# Patient Record
Sex: FEMALE | Race: WHITE | Employment: FULL TIME | URBAN - METROPOLITAN AREA
[De-identification: names, ages, dates, MRNs, and addresses within clinical notes are randomized per-mention and may not be internally consistent; named-entity substitution may affect disease eponyms.]

---

## 2017-01-10 ENCOUNTER — HOSPITAL ENCOUNTER (EMERGENCY)
Age: 37
Discharge: HOME | End: 2017-01-10
Attending: EMERGENCY MEDICINE

## 2017-01-10 ENCOUNTER — APPOINTMENT (OUTPATIENT)
Dept: GENERAL RADIOLOGY | Age: 37
End: 2017-01-10
Attending: EMERGENCY MEDICINE

## 2017-01-10 VITALS
HEIGHT: 62 IN | HEART RATE: 91 BPM | WEIGHT: 167 LBS | RESPIRATION RATE: 18 BRPM | SYSTOLIC BLOOD PRESSURE: 126 MMHG | DIASTOLIC BLOOD PRESSURE: 76 MMHG | OXYGEN SATURATION: 99 % | BODY MASS INDEX: 30.73 KG/M2 | TEMPERATURE: 97.3 F

## 2017-01-10 DIAGNOSIS — S86.891A SHIN SPLINTS, RIGHT, INITIAL ENCOUNTER: Primary | ICD-10-CM

## 2017-01-10 LAB — HCG UR QL: NEGATIVE

## 2017-01-10 NOTE — DISCHARGE INSTRUCTIONS
Shin Splints: Care Instructions  Your Care Instructions    Shin splints cause pain in the shin, the front part of the lower leg. They can also cause swelling. The pain is most likely from repeated stress on the shinbone (tibia) and the tissue that connects the muscle to the tibia. Shin splints are common in people who run or jog. Activities where you run or jump on hard surfaces, such as basketball or tennis, can also lead to shin splints. They can also be caused by training too hard or running in shoes that are worn out. Follow-up care is a key part of your treatment and safety. Be sure to make and go to all appointments, and call your doctor if you are having problems. It's also a good idea to know your test results and keep a list of the medicines you take. How can you care for yourself at home? · Stop doing the activity that is causing pain, or do less of it, until you feel better. ¨ Run or exercise only on soft surfaces, such as dirt or grass. ¨ Run on level ground, and avoid hills. ¨ Reduce your speed and distance when you run. · If you have pain, prop up the sore leg on a pillow and ice it. Try to keep your leg above the level of your heart. This will help reduce swelling. ¨ Put ice or a cold pack on the area for 10 to 20 minutes at a time. Try to do this every 1 to 2 hours (when you are awake) or until the swelling goes down. Put a thin cloth between the ice and your skin. · Take an over-the-counter pain medicine, such as acetaminophen (Tylenol), ibuprofen (Advil, Motrin), or naproxen (Aleve). Be safe with medicines. Read and follow all instructions on the label. · If your doctor gave you stretches or exercises to do, do them exactly as directed. · Get a new pair of shoes. Pick shoes with good arch support and a cushioned sole. Or try shoe inserts (orthotics). Use them in both shoes, even if only one leg hurts. · Don't go back to your old exercise routine too quickly after you feel better. Start slowly. Then, bit by bit, increase how often and how long you work out. If you start out too fast, your pain may come back. When should you call for help? Watch closely for changes in your health, and be sure to contact your doctor if:  · You have new or worse pain in your shin. · The pain becomes focused in one small area of the shin. · You are not getting better after 2 weeks. Where can you learn more? Go to http://lily-buck.info/. Enter O844 in the search box to learn more about \"Shin Splints: Care Instructions. \"  Current as of: May 23, 2016  Content Version: 11.1  © 5804-3375 [employer]. Care instructions adapted under license by [employer] (which disclaims liability or warranty for this information). If you have questions about a medical condition or this instruction, always ask your healthcare professional. [address] any warranty or liability for your use of this information.

## 2017-01-10 NOTE — UC PROVIDER NOTE
Patient is a 29 y.o. female presenting with leg pain. The history is provided by the patient. Leg Pain    This is a new problem. The current episode started more than 2 days ago (4 days). The problem occurs constantly. The problem has not changed since onset. The pain is present in the right lower leg. The quality of the pain is described as sharp. The pain is at a severity of 7/10. The pain is moderate. Pertinent negatives include no numbness, full range of motion, no stiffness, no tingling and no back pain. The symptoms are aggravated by standing and activity. She has tried rest for the symptoms. The treatment provided no relief. There has been a history of trauma (stepped down off a step awkwardly). Past Medical History   Diagnosis Date    ADD (attention deficit disorder)     Anemia NEC      after 1st c/s    AR (allergic rhinitis)     Asthma      seasonal, uses inhaler prn        Past Surgical History   Procedure Laterality Date    Pr  delivery only       2009    Hx other surgical       bilateral wrist surgery for ganglion cysts         Family History   Problem Relation Age of Onset    Cancer Maternal Grandmother      ovarian    Psychiatric Disorder Maternal Grandmother      Alzheimers    Cancer Maternal Grandfather      prostate    Psychiatric Disorder Maternal Grandfather      Parkinsons    Cancer Paternal Grandmother      Breast    Asthma Paternal Grandmother     Diabetes Paternal Grandmother     Alcohol abuse Neg Hx     Arthritis-rheumatoid Neg Hx     Bleeding Prob Neg Hx     Elevated Lipids Neg Hx     Headache Neg Hx     Heart Disease Neg Hx     Hypertension Neg Hx     Lung Disease Neg Hx     Migraines Neg Hx     Stroke Neg Hx     Mental Retardation Neg Hx         Social History     Social History    Marital status:      Spouse name: N/A    Number of children: N/A    Years of education: N/A     Occupational History    Not on file.      Social History Main Topics    Smoking status: Never Smoker    Smokeless tobacco: Never Used    Alcohol use No    Drug use: No    Sexual activity: Not on file     Other Topics Concern    Not on file     Social History Narrative                ALLERGIES: Amoxicillin and Pcn [penicillins]    Review of Systems   Musculoskeletal: Negative for back pain and stiffness. Neurological: Negative for tingling and numbness. All other systems reviewed and are negative. Vitals:    01/10/17 1601   BP: 126/76   Pulse: 91   Resp: 18   Temp: 97.3 °F (36.3 °C)   SpO2: 99%   Weight: 75.8 kg (167 lb)   Height: 5' 2\" (1.575 m)       Physical Exam   Constitutional: She is oriented to person, place, and time. She appears well-developed and well-nourished. No distress. HENT:   Head: Normocephalic and atraumatic. Musculoskeletal: Normal range of motion. She exhibits no edema, tenderness or deformity. Right lower leg, no erythema, no swelling, no point tenderness, full rom right knee and ankle, neurovascular intact distally   Neurological: She is alert and oriented to person, place, and time. No cranial nerve deficit. She exhibits normal muscle tone. Skin: Skin is warm and dry. No rash noted. She is not diaphoretic. No erythema. No pallor. Psychiatric: She has a normal mood and affect. Her behavior is normal. Judgment and thought content normal.   Nursing note and vitals reviewed.       MDM     Differential Diagnosis; Clinical Impression; Plan:     Right lower leg pain, normal ankle and knee exam, xrays negative for acute finding  Differential includes stress fracture, shin splints, with normal xrays and lack of repetitive exercise, stress fracture less likely      Procedures

## 2017-08-27 ENCOUNTER — HOSPITAL ENCOUNTER (EMERGENCY)
Age: 37
Discharge: HOME | End: 2017-08-27
Attending: FAMILY MEDICINE

## 2017-08-27 VITALS
RESPIRATION RATE: 16 BRPM | SYSTOLIC BLOOD PRESSURE: 160 MMHG | HEART RATE: 78 BPM | BODY MASS INDEX: 30.35 KG/M2 | DIASTOLIC BLOOD PRESSURE: 63 MMHG | WEIGHT: 164.9 LBS | OXYGEN SATURATION: 97 % | TEMPERATURE: 98.1 F | HEIGHT: 62 IN
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
## 2017-08-27 RX ORDER — PREDNISONE 5 MG/1
TABLET ORAL
Qty: 21 TAB | Refills: 0 | Status: SHIPPED | OUTPATIENT
Start: 2017-08-27 | End: 2019-03-16

## 2017-08-27 NOTE — UC PROVIDER NOTE
Patient is a 40 y.o. female presenting with Insect Bite. The history is provided by the patient. Insect Bite   This is a new problem. The current episode started more than 2 days ago. The problem occurs daily. The problem has not changed since onset. Pertinent negatives include no chest pain. Nothing aggravates the symptoms. Nothing relieves the symptoms. She has tried a warm compress for the symptoms. The treatment provided mild relief. Past Medical History:   Diagnosis Date    ADD (attention deficit disorder)     Anemia NEC     after 1st c/s    AR (allergic rhinitis)     Asthma     seasonal, uses inhaler prn        Past Surgical History:   Procedure Laterality Date     DELIVERY ONLY      2009    HX OTHER SURGICAL      bilateral wrist surgery for ganglion cysts         Family History   Problem Relation Age of Onset    Cancer Maternal Grandmother      ovarian    Psychiatric Disorder Maternal Grandmother      Alzheimers    Cancer Maternal Grandfather      prostate    Psychiatric Disorder Maternal Grandfather      Parkinsons    Cancer Paternal Grandmother      Breast    Asthma Paternal Grandmother     Diabetes Paternal Grandmother     Alcohol abuse Neg Hx     Arthritis-rheumatoid Neg Hx     Bleeding Prob Neg Hx     Elevated Lipids Neg Hx     Headache Neg Hx     Heart Disease Neg Hx     Hypertension Neg Hx     Lung Disease Neg Hx     Migraines Neg Hx     Stroke Neg Hx     Mental Retardation Neg Hx         Social History     Social History    Marital status:      Spouse name: N/A    Number of children: N/A    Years of education: N/A     Occupational History    Not on file.      Social History Main Topics    Smoking status: Never Smoker    Smokeless tobacco: Never Used    Alcohol use No    Drug use: No    Sexual activity: Not on file     Other Topics Concern    Not on file     Social History Narrative                ALLERGIES: Amoxicillin and Pcn [penicillins]    Review of Systems   Constitutional: Negative for activity change and fatigue. HENT: Negative for congestion. Respiratory: Negative for chest tightness. Cardiovascular: Negative for chest pain. Vitals:    08/27/17 1554   BP: 160/63   Pulse: 78   Resp: 16   Temp: 98.1 °F (36.7 °C)   SpO2: 97%   Weight: 74.8 kg (164 lb 14.4 oz)   Height: 5' 2\" (1.575 m)       Physical Exam   Constitutional: She is oriented to person, place, and time. She appears well-developed and well-nourished. HENT:   Head: Atraumatic. Eyes: EOM are normal.   Pulmonary/Chest: Effort normal and breath sounds normal.   Neurological: She is alert and oriented to person, place, and time. Skin: Skin is warm and dry. Rash noted. Psychiatric: She has a normal mood and affect. Her behavior is normal. Judgment and thought content normal.   Nursing note and vitals reviewed. MDM     Differential Diagnosis; Clinical Impression; Plan:     CLINICAL IMPRESSION:  Bug bites, initial encounter  (primary encounter diagnosis)    Plan:  1. Suspect bed bugs  2. Medrol dose pack  3. Risk of Significant Complications, Morbidity, and/or Mortality:   Presenting problems: Moderate  Diagnostic procedures: Moderate  Management options:   Moderate  Progress:   Patient progress:  Stable      Procedures

## 2018-05-21 ENCOUNTER — OFFICE VISIT (OUTPATIENT)
Dept: URGENT CARE | Age: 38
End: 2018-05-21

## 2018-05-21 VITALS
RESPIRATION RATE: 16 BRPM | HEART RATE: 64 BPM | WEIGHT: 177.3 LBS | SYSTOLIC BLOOD PRESSURE: 106 MMHG | OXYGEN SATURATION: 99 % | TEMPERATURE: 97.3 F | DIASTOLIC BLOOD PRESSURE: 53 MMHG | HEIGHT: 62 IN | BODY MASS INDEX: 32.63 KG/M2

## 2018-05-21 DIAGNOSIS — H60.12 CELLULITIS OF LEFT PINNA: Primary | ICD-10-CM

## 2018-05-21 RX ORDER — SULFAMETHOXAZOLE AND TRIMETHOPRIM 800; 160 MG/1; MG/1
1 TABLET ORAL 2 TIMES DAILY
Qty: 20 TAB | Refills: 0 | Status: SHIPPED | OUTPATIENT
Start: 2018-05-21 | End: 2018-05-31

## 2018-05-21 RX ORDER — IBUPROFEN 600 MG/1
600 TABLET ORAL
Qty: 24 TAB | Refills: 0 | Status: SHIPPED | OUTPATIENT
Start: 2018-05-21 | End: 2019-03-16

## 2018-05-21 NOTE — PATIENT INSTRUCTIONS
Cellulitis: Care Instructions  Your Care Instructions    Cellulitis is a skin infection. It often occurs after a break in the skin from a scrape, cut, bite, or puncture, or after a rash. The doctor has checked you carefully, but problems can develop later. If you notice any problems or new symptoms, get medical treatment right away. Follow-up care is a key part of your treatment and safety. Be sure to make and go to all appointments, and call your doctor if you are having problems. It's also a good idea to know your test results and keep a list of the medicines you take. How can you care for yourself at home? · Take your antibiotics as directed. Do not stop taking them just because you feel better. You need to take the full course of antibiotics. · Prop up the infected area on pillows to reduce pain and swelling. Try to keep the area above the level of your heart as often as you can. · If your doctor told you how to care for your wound, follow your doctor's instructions. If you did not get instructions, follow this general advice:  ¨ Wash the wound with clean water 2 times a day. Don't use hydrogen peroxide or alcohol, which can slow healing. ¨ You may cover the wound with a thin layer of petroleum jelly, such as Vaseline, and a nonstick bandage. ¨ Apply more petroleum jelly and replace the bandage as needed. · Be safe with medicines. Take pain medicines exactly as directed. ¨ If the doctor gave you a prescription medicine for pain, take it as prescribed. ¨ If you are not taking a prescription pain medicine, ask your doctor if you can take an over-the-counter medicine. To prevent cellulitis in the future  · Try to prevent cuts, scrapes, or other injuries to your skin. Cellulitis most often occurs where there is a break in the skin. · If you get a scrape, cut, mild burn, or bite, wash the wound with clean water as soon as you can to help avoid infection.  Don't use hydrogen peroxide or alcohol, which can slow healing. · If you have swelling in your legs (edema), support stockings and good skin care may help prevent leg sores and cellulitis. · Take care of your feet, especially if you have diabetes or other conditions that increase the risk of infection. Wear shoes and socks. Do not go barefoot. If you have athlete's foot or other skin problems on your feet, talk to your doctor about how to treat them. When should you call for help? Call your doctor now or seek immediate medical care if:  ? · You have signs that your infection is getting worse, such as:  ¨ Increased pain, swelling, warmth, or redness. ¨ Red streaks leading from the area. ¨ Pus draining from the area. ¨ A fever. ? · You get a rash. ? Watch closely for changes in your health, and be sure to contact your doctor if:  ? · You are not getting better after 1 day (24 hours). ? · You do not get better as expected. Where can you learn more? Go to http://lily-buck.info/. Nadeen Terrell in the search box to learn more about \"Cellulitis: Care Instructions. \"  Current as of: October 13, 2016  Content Version: 11.4  © 1119-4307 [employer]. Care instructions adapted under license by [employer] (which disclaims liability or warranty for this information). If you have questions about a medical condition or this instruction, always ask your healthcare professional. [place] [address] any warranty or liability for your use of this information.

## 2018-05-21 NOTE — MR AVS SNAPSHOT
[address] [address] 
[phone] Patient: Torey Tejada MRN: [id number] NOO:[id number] Visit Information Date & Time Provider Department Dept. Phone Encounter #  
 5/21/2018  9:00 AM [facility] [phone] [id number] Follow-up Instructions Return if symptoms worsen or fail to improve, for Follow up with PCP. Upcoming Health Maintenance Date Due DTaP/Tdap/Td series (1 - Tdap) 2/8/2002 PAP AKA CERVICAL CYTOLOGY 2/8/2002 Influenza Age 5 to Adult 8/1/2018 Allergies as of 5/21/2018  Review Complete On: 5/21/2018 By: Nicolette Rpoer RN Severity Noted Reaction Type Reactions Amoxicillin  05/21/2018    Hives Pcn [Penicillins]  05/21/2018    Hives Current Immunizations  Never Reviewed No immunizations on file. Not reviewed this visit You Were Diagnosed With   
  
 Codes Comments Cellulitis of left pinna    -  Primary ICD-10-CM: H60.12 ICD-9-CM: 380.10 Vitals BP Pulse Temp Resp Height(growth percentile) Weight(growth percentile) 106/53 64 97.3 °F (36.3 °C) 16 5' 2\" (1.575 m) 177 lb 4.8 oz (80.4 kg) SpO2 BMI OB Status Smoking Status 99% 32.43 kg/m2 Breastfeeding Never Smoker BMI and BSA Data Body Mass Index Body Surface Area  
 32.43 kg/m 2 1.88 m 2 Preferred Pharmacy Pharmacy Name Phone [facility] [address]. [phone] Your Updated Medication List  
  
   
This list is accurate as of 5/21/18  9:38 AM.  Always use your most recent med list.  
  
  
  
  
 FLONASE NA  
by Nasal route. ibuprofen 600 mg tablet Commonly known as:  MOTRIN Take 1 Tab by mouth every eight (8) hours as needed for Pain. trimethoprim-sulfamethoxazole 160-800 mg per tablet Commonly known as:  BACTRIM DS, SEPTRA DS  
 Take 1 Tab by mouth two (2) times a day for 10 days. Prescriptions Sent to Pharmacy Refills  
 trimethoprim-sulfamethoxazole (BACTRIM DS, SEPTRA DS) 160-800 mg per tablet 0 Sig: Take 1 Tab by mouth two (2) times a day for 10 days. Class: Normal  
 Pharmacy: [facility] [address], [address] [place] [address] RD. Ph #: [phone] Route: Oral  
 ibuprofen (MOTRIN) 600 mg tablet 0 Sig: Take 1 Tab by mouth every eight (8) hours as needed for Pain. Class: Normal  
 Pharmacy: [facility] [address], [address] [place] [address] RD. Ph #: [phone] Route: Oral  
  
Follow-up Instructions Return if symptoms worsen or fail to improve, for Follow up with PCP. Patient Instructions Cellulitis: Care Instructions Your Care Instructions Cellulitis is a skin infection. It often occurs after a break in the skin from a scrape, cut, bite, or puncture, or after a rash. The doctor has checked you carefully, but problems can develop later. If you notice any problems or new symptoms, get medical treatment right away. Follow-up care is a key part of your treatment and safety. Be sure to make and go to all appointments, and call your doctor if you are having problems. It's also a good idea to know your test results and keep a list of the medicines you take. How can you care for yourself at home? · Take your antibiotics as directed. Do not stop taking them just because you feel better. You need to take the full course of antibiotics. · Prop up the infected area on pillows to reduce pain and swelling. Try to keep the area above the level of your heart as often as you can. · If your doctor told you how to care for your wound, follow your doctor's instructions. If you did not get instructions, follow this general advice: ¨ Wash the wound with clean water 2 times a day. Don't use hydrogen peroxide or alcohol, which can slow healing. ¨ You may cover the wound with a thin layer of petroleum jelly, such as Vaseline, and a nonstick bandage. ¨ Apply more petroleum jelly and replace the bandage as needed. · Be safe with medicines. Take pain medicines exactly as directed. ¨ If the doctor gave you a prescription medicine for pain, take it as prescribed. ¨ If you are not taking a prescription pain medicine, ask your doctor if you can take an over-the-counter medicine. To prevent cellulitis in the future · Try to prevent cuts, scrapes, or other injuries to your skin. Cellulitis most often occurs where there is a break in the skin. · If you get a scrape, cut, mild burn, or bite, wash the wound with clean water as soon as you can to help avoid infection. Don't use hydrogen peroxide or alcohol, which can slow healing. · If you have swelling in your legs (edema), support stockings and good skin care may help prevent leg sores and cellulitis. · Take care of your feet, especially if you have diabetes or other conditions that increase the risk of infection. Wear shoes and socks. Do not go barefoot. If you have athlete's foot or other skin problems on your feet, talk to your doctor about how to treat them. When should you call for help? Call your doctor now or seek immediate medical care if: 
? · You have signs that your infection is getting worse, such as: 
¨ Increased pain, swelling, warmth, or redness. ¨ Red streaks leading from the area. ¨ Pus draining from the area. ¨ A fever. ? · You get a rash. ? Watch closely for changes in your health, and be sure to contact your doctor if: 
? · You are not getting better after 1 day (24 hours). ? · You do not get better as expected. Where can you learn more? Go to http://lily-buck.info/. Raffaele Rockwell in the search box to learn more about \"Cellulitis: Care Instructions. \" Current as of: October 13, 2016 Content Version: 11.4 © 0243-8101 [employer]. Care instructions adapted under license by [employer] (which disclaims liability or warranty for this information). If you have questions about a medical condition or this instruction, always ask your healthcare professional. [address] any warranty or liability for your use of this information. Introducing [facility]! Jasmina Raymond introduces Feeligo patient portal. Now you can access parts of your medical record, email your doctor's office, and request medication refills online. 1. In your internet browser, go to [email or web address] 2. Click on the First Time User? Click Here link in the Sign In box. You will see the New Member Sign Up page. 3. Enter your Feeligo Access Code exactly as it appears below. You will not need to use this code after youve completed the sign-up process. If you do not sign up before the expiration date, you must request a new code. · Feeligo Access Code: [email or web address] Expires: 8/19/2018  9:38 AM 
 
4. Enter the last four digits of your Social Security Number (xxxx) and Date of Birth (mm/dd/yyyy) as indicated and click Submit. You will be taken to the next sign-up page. 5. Create a Feeligo ID. This will be your Feeligo login ID and cannot be changed, so think of one that is secure and easy to remember. 6. Create a Feeligo password. You can change your password at any time. 7. Enter your Password Reset Question and Answer. This can be used at a later time if you forget your password. 8. Enter your e-mail address. You will receive e-mail notification when new information is available in [address] 9. Click Sign Up. You can now view and download portions of your medical record. 10. Click the Download Summary menu link to download a portable copy of your medical information.  
 
If you have questions, please visit the Frequently Asked Questions section of the [employer]. Remember, [employer] is NOT to be used for urgent needs. For medical emergencies, dial 911. Now available from your iPhone and Android! Please provide this summary of care documentation to your next provider. If you have any questions after today's visit, please call [phone].

## 2018-05-21 NOTE — PROGRESS NOTES
Patient is a 41 y.o. female presenting with ear pain. Ear Pain   This is a new problem. The current episode started yesterday. The problem occurs constantly. The problem has been rapidly worsening. Associated symptoms comments: Swelling, redness and tenderness of left pinna  No injury  No insect bite. Exacerbated by: palpation  She has tried nothing for the symptoms. History reviewed. No pertinent past medical history. History reviewed. No pertinent surgical history. History reviewed. No pertinent family history. Social History     Social History    Marital status:      Spouse name: N/A    Number of children: N/A    Years of education: N/A     Occupational History    Not on file. Social History Main Topics    Smoking status: Never Smoker    Smokeless tobacco: Never Used    Alcohol use Not on file    Drug use: Not on file    Sexual activity: Not on file     Other Topics Concern    Not on file     Social History Narrative    No narrative on file                ALLERGIES: Amoxicillin and Pcn [penicillins]    Review of Systems   HENT: Positive for ear pain. Vitals:    05/21/18 0904   BP: 106/53   Pulse: 64   Resp: 16   Temp: 97.3 °F (36.3 °C)   SpO2: 99%   Weight: 177 lb 4.8 oz (80.4 kg)   Height: 5' 2\" (1.575 m)       Physical Exam   HENT:   Left Ear: There is swelling (- erythema- induration and tendeness of PINNA). Ears:    Nursing note and vitals reviewed. MDM    Procedures        ICD-10-CM ICD-9-CM    1. Cellulitis of left pinna H60.12 380.10      Medications Ordered Today   Medications    trimethoprim-sulfamethoxazole (BACTRIM DS, SEPTRA DS) 160-800 mg per tablet     Sig: Take 1 Tab by mouth two (2) times a day for 10 days. Dispense:  20 Tab     Refill:  0    ibuprofen (MOTRIN) 600 mg tablet     Sig: Take 1 Tab by mouth every eight (8) hours as needed for Pain. Dispense:  24 Tab     Refill:  0     No results found for any visits on 05/21/18.   The patients condition was discussed with the patient and they understand. The patient is to follow up with primary care doctor. If signs and symptoms become worse the pt is to go to the ER. The patient is to take medications as prescribed.

## 2019-03-16 ENCOUNTER — OFFICE VISIT (OUTPATIENT)
Dept: URGENT CARE | Age: 39
End: 2019-03-16

## 2019-03-16 VITALS
RESPIRATION RATE: 16 BRPM | WEIGHT: 167 LBS | HEIGHT: 62 IN | DIASTOLIC BLOOD PRESSURE: 62 MMHG | HEART RATE: 80 BPM | BODY MASS INDEX: 30.73 KG/M2 | OXYGEN SATURATION: 99 % | TEMPERATURE: 98.8 F | SYSTOLIC BLOOD PRESSURE: 112 MMHG

## 2019-03-16 DIAGNOSIS — J10.1 INFLUENZA A: Primary | ICD-10-CM

## 2019-03-16 LAB
FLUAV+FLUBV AG NOSE QL IA.RAPID: NEGATIVE POS/NEG
FLUAV+FLUBV AG NOSE QL IA.RAPID: POSITIVE POS/NEG
VALID INTERNAL CONTROL?: YES

## 2019-03-16 RX ORDER — OSELTAMIVIR PHOSPHATE 75 MG/1
75 CAPSULE ORAL 2 TIMES DAILY
Qty: 10 CAP | Refills: 0 | Status: SHIPPED | OUTPATIENT
Start: 2019-03-16 | End: 2019-03-21

## 2019-03-16 NOTE — PROGRESS NOTES
Johan Nicole is a 46 y.o. female who present complaining of flu-like symptoms: fevers, chills, myalgias, dry cough, nasal congestion, runny nose. Symptom onset 1 day ago without any preceding illness. No aggravating or alleviating factors. Symptoms are constant and overall worsening. Promotes no decrease in PO intake of fluids. Denies: severe lethargy, SOB, syncope/near syncope, vomiting/diarrhea, chest pain, chest pain with breathing, labored breathing, severe headache, non-intractable fevers. Family members have tested positive for flu. Hx significant for:  Allergies, anemia, asthma. Denies any flare up of asthma or albuterol use.               Past Medical History:   Diagnosis Date    ADD (attention deficit disorder)     Anemia NEC     after 1st c/s    AR (allergic rhinitis)     Asthma     seasonal, uses inhaler prn        Past Surgical History:   Procedure Laterality Date     DELIVERY ONLY      2009    HX OTHER SURGICAL      bilateral wrist surgery for ganglion cysts         Family History   Problem Relation Age of Onset    Cancer Maternal Grandmother         ovarian    Psychiatric Disorder Maternal Grandmother         Alzheimers    Cancer Maternal Grandfather         prostate    Psychiatric Disorder Maternal Grandfather         Parkinsons    Cancer Paternal Grandmother         Breast    Asthma Paternal Grandmother     Diabetes Paternal Grandmother     Alcohol abuse Neg Hx     Arthritis-rheumatoid Neg Hx     Bleeding Prob Neg Hx     Elevated Lipids Neg Hx     Headache Neg Hx     Heart Disease Neg Hx     Hypertension Neg Hx     Lung Disease Neg Hx     Migraines Neg Hx     Stroke Neg Hx     Mental Retardation Neg Hx         Social History     Socioeconomic History    Marital status:      Spouse name: Not on file    Number of children: Not on file    Years of education: Not on file    Highest education level: Not on file   Social Needs    Financial resource strain: Not on file    Food insecurity - worry: Not on file    Food insecurity - inability: Not on file    Transportation needs - medical: Not on file   [place] needs - non-medical: Not on file   Occupational History    Not on file   Tobacco Use    Smoking status: Never Smoker    Smokeless tobacco: Never Used   Substance and Sexual Activity    Alcohol use: No    Drug use: No    Sexual activity: Not on file   Other Topics Concern    Not on file   Social History Narrative    ** Merged History Encounter **                     ALLERGIES: Amoxicillin and Pcn [penicillins]    Review of Systems   All other systems reviewed and are negative. Vitals:    03/16/19 1043   BP: 112/62   Pulse: 80   Resp: 16   Temp: 98.8 °F (37.1 °C)   SpO2: 99%   Weight: 167 lb (75.8 kg)   Height: 5' 2\" (1.575 m)       Physical Exam   Constitutional: She is oriented to person, place, and time. No distress. Non-toxic appearing, well hydrated   HENT:   Mouth/Throat: No oropharyngeal exudate. TMs normal appearing bilat  Erythematous nasal turbinates with clear rhinorrhea  OP mild erythema without swelling or exudate. Uvula midline. No oral lesions. Eyes: Conjunctivae and EOM are normal. Pupils are equal, round, and reactive to light. No scleral icterus. Neck: Normal range of motion. Neck supple. Cardiovascular: Normal rate, regular rhythm and normal heart sounds. Exam reveals no gallop and no friction rub. No murmur heard. Pulmonary/Chest: Effort normal and breath sounds normal. No respiratory distress. She has no wheezes. She has no rales. Lymphadenopathy:     She has no cervical adenopathy. Neurological: She is alert and oriented to person, place, and time. Skin: Skin is warm and dry. No rash noted. She is not diaphoretic. No erythema. No pallor. Psychiatric: She has a normal mood and affect. Her behavior is normal. Thought content normal.   Nursing note and vitals reviewed.       MDM     Differential Diagnosis; Clinical Impression; Plan:       CLINICAL IMPRESSION:  (J10.1) Influenza A  (primary encounter diagnosis)    Orders Placed This Encounter      oseltamivir (TAMIFLU) 75 mg capsule          Sig: Take 1 Cap by mouth two (2) times a day for 5 days. Dispense:  10 Cap          Refill:  0      Plan:  Flu A positive without complication today  Tamiflu Rx sent in  OTC tyelnol or Motrin  Fluids  Review handouts       We have reviewed concerning signs/symptoms, normal vs abnormal progression of medical condition and when to seek immediate medical attention. Schedule with PCP or Urgent Care immediately for worsening or new symptoms. See your PCP if there is not at least some improvement in symptoms within the next 1 week  You should see your PCP for updates on your routine health maintenance.              Procedures

## 2019-03-16 NOTE — PATIENT INSTRUCTIONS
Follow up with PCP without improvement over next 5-7 days sooner/immediately for new or worsening       Influenza (Flu): Care Instructions  Your Care Instructions    Influenza (flu) is an infection in the lungs and breathing passages. It is caused by the influenza virus. There are different strains, or types, of the flu virus from year to year. Unlike the common cold, the flu comes on suddenly and the symptoms, such as a cough, congestion, fever, chills, fatigue, aches, and pains, are more severe. These symptoms may last up to 10 days. Although the flu can make you feel very sick, it usually doesn't cause serious health problems. Home treatment is usually all you need for flu symptoms. But your doctor may prescribe antiviral medicine to prevent other health problems, such as pneumonia, from developing. Older people and those who have a long-term health condition, such as lung disease, are most at risk for having pneumonia or other health problems. Follow-up care is a key part of your treatment and safety. Be sure to make and go to all appointments, and call your doctor if you are having problems. It's also a good idea to know your test results and keep a list of the medicines you take. How can you care for yourself at home? · Get plenty of rest.  · Drink plenty of fluids, enough so that your urine is light yellow or clear like water. If you have kidney, heart, or liver disease and have to limit fluids, talk with your doctor before you increase the amount of fluids you drink. · Take an over-the-counter pain medicine if needed, such as acetaminophen (Tylenol), ibuprofen (Advil, Motrin), or naproxen (Aleve), to relieve fever, headache, and muscle aches. Read and follow all instructions on the label. No one younger than 20 should take aspirin. It has been linked to Reye syndrome, a serious illness. · Do not smoke. Smoking can make the flu worse.  If you need help quitting, talk to your doctor about stop-smoking programs and medicines. These can increase your chances of quitting for good. · Breathe moist air from a hot shower or from a sink filled with hot water to help clear a stuffy nose. · Before you use cough and cold medicines, check the label. These medicines may not be safe for young children or for people with certain health problems. · If the skin around your nose and lips becomes sore, put some petroleum jelly on the area. · To ease coughing:  ? Drink fluids to soothe a scratchy throat. ? Suck on cough drops or plain hard candy. ? Take an over-the-counter cough medicine that contains dextromethorphan to help you get some sleep. Read and follow all instructions on the label. ? Raise your head at night with an extra pillow. This may help you rest if coughing keeps you awake. · Take any prescribed medicine exactly as directed. Call your doctor if you think you are having a problem with your medicine. To avoid spreading the flu  · Wash your hands regularly, and keep your hands away from your face. · Stay home from school, work, and other public places until you are feeling better and your fever has been gone for at least 24 hours. The fever needs to have gone away on its own without the help of medicine. · Ask people living with you to talk to their doctors about preventing the flu. They may get antiviral medicine to keep from getting the flu from you. · To prevent the flu in the future, get a flu vaccine every fall. Encourage people living with you to get the vaccine. · Cover your mouth when you cough or sneeze. When should you call for help? Call 911 anytime you think you may need emergency care.  For example, call if:    · You have severe trouble breathing.    Call your doctor now or seek immediate medical care if:    · You have new or worse trouble breathing.     · You seem to be getting much sicker.     · You feel very sleepy or confused.     · You have a new or higher fever.     · You get a new rash.    Watch closely for changes in your health, and be sure to contact your doctor if:    · You begin to get better and then get worse.     · You are not getting better after 1 week. Where can you learn more? Go to http://lily-buck.info/. Enter C645 in the search box to learn more about \"Influenza (Flu): Care Instructions. \"  Current as of: September 5, 2018  Content Version: 11.9  © 6586-8660 [email or web address]. Care instructions adapted under license by [employer] (which disclaims liability or warranty for this information). If you have questions about a medical condition or this instruction, always ask your healthcare professional. [address] any warranty or liability for your use of this information.

## 2021-11-12 ENCOUNTER — OFFICE VISIT (OUTPATIENT)
Dept: URGENT CARE | Age: 41
End: 2021-11-12

## 2021-11-12 VITALS — HEART RATE: 76 BPM | RESPIRATION RATE: 16 BRPM | OXYGEN SATURATION: 96 % | TEMPERATURE: 98.3 F

## 2021-11-12 RX ORDER — HYDROXYZINE HYDROCHLORIDE 10 MG/1
10 TABLET, FILM COATED ORAL
COMMUNITY

## 2021-11-12 NOTE — PROGRESS NOTES
Patient presents for with SOB and pleuritic CP x 1 week. Dx'ed with COVID 2 weeks ago. Pt sent here to rule out PE by PCP. Pt referred to ER for further evaluation. VSS. Lungs clear but patient unable to take a deep breath.

## 2023-05-10 RX ORDER — HYDROXYZINE HYDROCHLORIDE 10 MG/1
TABLET, FILM COATED ORAL 3 TIMES DAILY PRN
COMMUNITY